# Patient Record
Sex: MALE | Race: WHITE | NOT HISPANIC OR LATINO | Employment: UNEMPLOYED | URBAN - METROPOLITAN AREA
[De-identification: names, ages, dates, MRNs, and addresses within clinical notes are randomized per-mention and may not be internally consistent; named-entity substitution may affect disease eponyms.]

---

## 2024-10-03 ENCOUNTER — HOSPITAL ENCOUNTER (EMERGENCY)
Facility: HOSPITAL | Age: 39
Discharge: HOME | End: 2024-10-03
Payer: MEDICAID

## 2024-10-03 ENCOUNTER — PHARMACY VISIT (OUTPATIENT)
Dept: PHARMACY | Facility: CLINIC | Age: 39
End: 2024-10-03
Payer: MEDICAID

## 2024-10-03 VITALS
WEIGHT: 213 LBS | OXYGEN SATURATION: 100 % | DIASTOLIC BLOOD PRESSURE: 88 MMHG | HEIGHT: 70 IN | SYSTOLIC BLOOD PRESSURE: 122 MMHG | BODY MASS INDEX: 30.49 KG/M2 | RESPIRATION RATE: 18 BRPM | TEMPERATURE: 99 F | HEART RATE: 86 BPM

## 2024-10-03 DIAGNOSIS — Z76.0 MEDICATION REFILL: Primary | ICD-10-CM

## 2024-10-03 DIAGNOSIS — I10 HYPERTENSION, UNSPECIFIED TYPE: ICD-10-CM

## 2024-10-03 PROCEDURE — RXMED WILLOW AMBULATORY MEDICATION CHARGE

## 2024-10-03 PROCEDURE — 99281 EMR DPT VST MAYX REQ PHY/QHP: CPT

## 2024-10-03 RX ORDER — LISINOPRIL 40 MG/1
40 TABLET ORAL DAILY
Qty: 30 TABLET | Refills: 0 | Status: SHIPPED | OUTPATIENT
Start: 2024-10-03 | End: 2024-11-02

## 2024-10-03 ASSESSMENT — PAIN SCALES - GENERAL: PAINLEVEL_OUTOF10: 0 - NO PAIN

## 2024-10-03 ASSESSMENT — LIFESTYLE VARIABLES
HAVE YOU EVER FELT YOU SHOULD CUT DOWN ON YOUR DRINKING: NO
EVER HAD A DRINK FIRST THING IN THE MORNING TO STEADY YOUR NERVES TO GET RID OF A HANGOVER: NO
TOTAL SCORE: 0
HAVE PEOPLE ANNOYED YOU BY CRITICIZING YOUR DRINKING: NO
EVER FELT BAD OR GUILTY ABOUT YOUR DRINKING: NO

## 2024-10-03 ASSESSMENT — COLUMBIA-SUICIDE SEVERITY RATING SCALE - C-SSRS
1. IN THE PAST MONTH, HAVE YOU WISHED YOU WERE DEAD OR WISHED YOU COULD GO TO SLEEP AND NOT WAKE UP?: NO
2. HAVE YOU ACTUALLY HAD ANY THOUGHTS OF KILLING YOURSELF?: NO
6. HAVE YOU EVER DONE ANYTHING, STARTED TO DO ANYTHING, OR PREPARED TO DO ANYTHING TO END YOUR LIFE?: NO

## 2024-10-03 ASSESSMENT — PAIN - FUNCTIONAL ASSESSMENT: PAIN_FUNCTIONAL_ASSESSMENT: 0-10

## 2024-10-03 NOTE — ED PROVIDER NOTES
HPI   Chief Complaint   Patient presents with    Med Refill     Pt reports that he has HTN but is out of his meds x 3 days. Pt reports he has not been able to find a new PCP as his old PCP closed their office       38-year-old male with a past history of hypertension presents to the emergency department today requesting a prescription refill.  Patient states that his primary care provider moved to [place] and he was not notified.  States that he has been unable to find a new PCP that we will refill his lisinopril.  He has been without it for 3 days.  Denies any complaints at this time and states he is feeling well overall however he was hoping to get a refill for his prescription until he is able to get in with somebody on an outpatient basis.  Denies any chest pain, shortness of breath, dizziness headache or syncope.  No abdominal or back pain.  No recent fever or chills.  States he is feeling well overall.                          [place] Coma Scale Score: 15                  Patient History   No past medical history on file.  No past surgical history on file.  No family history on file.  Social History     Tobacco Use    Smoking status: Not on file    Smokeless tobacco: Not on file   Substance Use Topics    Alcohol use: Not on file    Drug use: Not on file       Physical Exam   ED Triage Vitals [10/03/24 1602]   Temperature Heart Rate Respirations BP   37.2 °C (99 °F) 86 18 122/88      Pulse Ox Temp Source Heart Rate Source Patient Position   100 % Temporal Monitor --      BP Location FiO2 (%)     -- --       Physical Exam  Vitals and nursing note reviewed.   Constitutional:       General: He is not in acute distress.     Appearance: Normal appearance. He is not toxic-appearing.   HENT:      Right Ear: Tympanic membrane normal.      Left Ear: Tympanic membrane normal.      Mouth/Throat:      Mouth: Mucous membranes are moist.      Pharynx: Oropharynx is clear.   Eyes:      Extraocular Movements: Extraocular  movements intact.      Pupils: Pupils are equal, round, and reactive to light.   Cardiovascular:      Rate and Rhythm: Normal rate and regular rhythm.      Pulses: Normal pulses.      Heart sounds: Normal heart sounds.   Pulmonary:      Effort: Pulmonary effort is normal.      Breath sounds: Normal breath sounds.   Abdominal:      General: Abdomen is flat. Bowel sounds are normal.      Palpations: Abdomen is soft.      Tenderness: There is no abdominal tenderness.   Musculoskeletal:         General: Normal range of motion.      Cervical back: Normal range of motion and neck supple.      Right lower leg: No edema.      Left lower leg: No edema.   Skin:     General: Skin is warm and dry.      Capillary Refill: Capillary refill takes less than 2 seconds.   Neurological:      General: No focal deficit present.      Mental Status: He is alert and oriented to person, place, and time.   Psychiatric:         Mood and Affect: Mood normal.         Behavior: Behavior normal.         Judgment: Judgment normal.         Labs Reviewed - No data to display  Pain Management Panel           No data to display              No orders to display       ED Course & MDM   Diagnoses as of 10/03/24 1627   Hypertension, unspecified type       Medical Decision Making  On initial evaluation patient is well-appearing in the emergency department at this time.  His physical exam is essentially benign.  He is asymptomatic currently.  Blood pressure is stable at this time.  Vital signs otherwise within normal limits as well.  I did place a referral for primary care for the patient.  I did discuss Blood pressure medication with him.  Performed meds to beds.  States he takes 40 mg of lisinopril daily.  Meds to beds was performed and brought down his medications for him.  Educated him on strict return precautions and close outpatient follow-up.  Patient verbalized understanding of the plan he has no further questions or concerns surround the importance  of reestablishing primary care and will be discharged home in stable condition.        Procedure  Procedures    I discussed the differential, results and discharge plan with the patient and/or family/friend/caregiver if present.  I emphasized the importance of follow-up with the physician I referred them to in the timeframe recommended.  I explained reasons for the patient to return to the Emergency Department. Additional verbal discharge instructions were also given and discussed with the patient to supplement those generated by the EMR. We also discussed medications that were prescribed (if any) including common side effects and interactions. The patient was advised to abstain from driving, operating heavy machinery or making significant decisions while taking medications such as opiates and muscle relaxers that may impair this. All questions were addressed.  They understand return precautions and discharge instructions. The patient and/or family/friend/caregiver expressed understanding.           Nicole Gallagher, DELMY-TORI  10/03/24 8486

## 2025-07-20 ENCOUNTER — HOSPITAL ENCOUNTER (EMERGENCY)
Facility: HOSPITAL | Age: 40
Discharge: HOME | End: 2025-07-20
Payer: MEDICAID

## 2025-07-20 VITALS
WEIGHT: 225 LBS | DIASTOLIC BLOOD PRESSURE: 98 MMHG | OXYGEN SATURATION: 98 % | HEART RATE: 78 BPM | TEMPERATURE: 98.1 F | HEIGHT: 69 IN | RESPIRATION RATE: 16 BRPM | BODY MASS INDEX: 33.33 KG/M2 | SYSTOLIC BLOOD PRESSURE: 150 MMHG

## 2025-07-20 DIAGNOSIS — I10 HYPERTENSION, UNSPECIFIED TYPE: Primary | ICD-10-CM

## 2025-07-20 PROCEDURE — 99283 EMERGENCY DEPT VISIT LOW MDM: CPT

## 2025-07-20 PROCEDURE — 2500000001 HC RX 250 WO HCPCS SELF ADMINISTERED DRUGS (ALT 637 FOR MEDICARE OP): Performed by: PHYSICIAN ASSISTANT

## 2025-07-20 RX ORDER — LISINOPRIL 10 MG/1
40 TABLET ORAL DAILY
Status: DISCONTINUED | OUTPATIENT
Start: 2025-07-20 | End: 2025-07-20

## 2025-07-20 RX ORDER — LISINOPRIL 10 MG/1
40 TABLET ORAL ONCE
Status: COMPLETED | OUTPATIENT
Start: 2025-07-20 | End: 2025-07-20

## 2025-07-20 RX ORDER — LISINOPRIL 40 MG/1
40 TABLET ORAL DAILY
Qty: 30 TABLET | Refills: 0 | Status: SHIPPED | OUTPATIENT
Start: 2025-07-20 | End: 2025-08-19

## 2025-07-20 RX ADMIN — LISINOPRIL 40 MG: 10 TABLET ORAL at 07:15

## 2025-07-20 ASSESSMENT — PAIN DESCRIPTION - PROGRESSION: CLINICAL_PROGRESSION: NOT CHANGED

## 2025-07-20 ASSESSMENT — LIFESTYLE VARIABLES
HAVE PEOPLE ANNOYED YOU BY CRITICIZING YOUR DRINKING: NO
TOTAL SCORE: 0
HAVE YOU EVER FELT YOU SHOULD CUT DOWN ON YOUR DRINKING: NO
EVER FELT BAD OR GUILTY ABOUT YOUR DRINKING: NO
EVER HAD A DRINK FIRST THING IN THE MORNING TO STEADY YOUR NERVES TO GET RID OF A HANGOVER: NO

## 2025-07-20 ASSESSMENT — PAIN - FUNCTIONAL ASSESSMENT: PAIN_FUNCTIONAL_ASSESSMENT: 0-10

## 2025-07-20 ASSESSMENT — PAIN SCALES - GENERAL: PAINLEVEL_OUTOF10: 0 - NO PAIN

## 2025-07-20 NOTE — ED TRIAGE NOTES
Pt to ed for med refill. States that he lost his rx and is not able to get appt with pcp for 2 weeks.

## 2025-07-20 NOTE — ED PROVIDER NOTES
EMERGENCY MEDICINE EVALUATION NOTE    History of Present Illness     Chief Complaint:   Chief Complaint   Patient presents with    Med Refill       HPI: Zachary Haile is a 39 y.o. male presents with a chief complaint of medication refill.  Patient reports that he is currently out of his lisinopril.  Patient reports that he is not sure if he takes 20 mg or 40 mg daily.  He states that he was here a few months ago and whenever the dosing was there is where he is supposed to be taking daily.  Patient reports that he occasionally gets a little bit of a headache from his blood pressure being high and his only occurs when he gets very high.  Patient states that currently does not have a headache.  Patient denies any change in his vision, nausea, vomiting, chest pain, shortness of breath.  Patient denies any weakness or numbness in any of his extremities.  Patient reports that he called his primary care provider to get in for refill but they were not able to get him in for 2 weeks and he does not want to have any adverse side effect from the blood pressure being significantly high.    Previous History   Medical History[1]  Surgical History[2]  Social History[3]  Family History[4]  Allergies[5]  Current Outpatient Medications   Medication Instructions    lisinopril 40 mg, oral, Daily       Physical Exam     Appearance: Alert, oriented , cooperative     Skin: Intact,  dry skin, no lesions, rash, petechiae or purpura.      Eyes: PERRLA, EOMs intact,  Conjunctiva pink with no redness or exudates.      ENT: Hearing grossly intact. Pharynx clear, uvula midline.      Neck: Supple. Trachea at midline.      Pulmonary: Clear bilaterally. No rales, rhonchi or wheezing. No accessory muscle use or stridor.     Cardiac: Normal rate and rhythm without murmur     Abdomen: Soft, nontender, active bowel sounds.     Musculoskeletal: Full range of motion.      Neurological:Cranial nerves II through XII are grossly intact, normal sensation,  "no weakness, no focal findings identified.     Results   Labs Reviewed - No data to display  No orders to display         ED Course & Medical Decision Making     Medications   lisinopril tablet 40 mg (has no administration in time range)     Heart Rate:  [83]   Temperature:  [36.7 °C (98.1 °F)]   Respirations:  [18]   BP: (155)/(111)   Height:  [175.3 cm (5' 9\")]   Weight:  [102 kg (225 lb)]   Pulse Ox:  [98 %]    ED Course as of 07/20/25 0647   Sun Jul 20, 2025   0645 Patient was seen and evaluated in the emergency department today.  Patient's blood pressure was 155/111 on check-in.  We discussed the plan of care going forward.  Patient says that he just wants his medication refilled.  Patient states he occasionally has a headache but has no change in vision nausea or vomiting.  Patient was offered full workup which included blood work as well as head CT which he declined stating he only gets the symptoms when his blood pressure is elevated.  Patient unsure of exact dosing.  Previous chart review shows that he takes 40 mg daily.  Patient will given 1 dose of 40 mg of lisinopril here in the emergency department.  Then I will prescribe 1 month for home.  Patient states that he will follow-up with his PCP in 2 weeks.  Patient encouraged to return here immediately with any worsening symptoms or to follow-up with his primary care provider as an outpatient. [CJ]      ED Course User Index  [CJ] Ari Cabrera PA-C         Diagnoses as of 07/20/25 0647   Hypertension, unspecified type       Procedures   Procedures    Diagnosis     1. Hypertension, unspecified type        Disposition   Discharged     ED Prescriptions       Medication Sig Dispense Start Date End Date Auth. Provider    lisinopril 40 mg tablet Take 1 tablet (40 mg) by mouth once daily. 30 tablet 7/20/2025 8/19/2025 Ari Cabrera PA-C            Disclaimer: This note was dictated by speech recognition. Minor errors in transcription may be present. Please call if " questions.         [1] No past medical history on file.  [2] No past surgical history on file.  [3]    [4] No family history on file.  [5]   Allergies  Allergen Reactions    Amoxicillin Anaphylaxis        Ari Cabrera PA-C  07/20/25 0685